# Patient Record
Sex: FEMALE | Race: BLACK OR AFRICAN AMERICAN | NOT HISPANIC OR LATINO | Employment: PART TIME | URBAN - METROPOLITAN AREA
[De-identification: names, ages, dates, MRNs, and addresses within clinical notes are randomized per-mention and may not be internally consistent; named-entity substitution may affect disease eponyms.]

---

## 2017-12-26 ENCOUNTER — HOSPITAL ENCOUNTER (EMERGENCY)
Facility: OTHER | Age: 32
Discharge: HOME | End: 2017-12-26
Attending: EMERGENCY MEDICINE
Payer: MEDICAID

## 2017-12-26 VITALS
SYSTOLIC BLOOD PRESSURE: 186 MMHG | DIASTOLIC BLOOD PRESSURE: 86 MMHG | OXYGEN SATURATION: 100 % | HEART RATE: 86 BPM | RESPIRATION RATE: 19 BRPM | HEIGHT: 66 IN | TEMPERATURE: 99 F | WEIGHT: 210 LBS | BODY MASS INDEX: 33.75 KG/M2

## 2017-12-26 DIAGNOSIS — J11.1 INFLUENZA-LIKE ILLNESS: Primary | ICD-10-CM

## 2017-12-26 LAB
B-HCG UR QL: NEGATIVE
CTP QC/QA: YES
FLUAV AG SPEC QL IA: NEGATIVE
FLUBV AG SPEC QL IA: NEGATIVE
SPECIMEN SOURCE: NORMAL

## 2017-12-26 PROCEDURE — 87400 INFLUENZA A/B EACH AG IA: CPT | Mod: 59

## 2017-12-26 PROCEDURE — 99284 EMERGENCY DEPT VISIT MOD MDM: CPT

## 2017-12-26 PROCEDURE — 81025 URINE PREGNANCY TEST: CPT | Performed by: PHYSICIAN ASSISTANT

## 2017-12-26 PROCEDURE — 25000003 PHARM REV CODE 250: Performed by: PHYSICIAN ASSISTANT

## 2017-12-26 RX ORDER — IBUPROFEN 600 MG/1
600 TABLET ORAL EVERY 6 HOURS PRN
Qty: 20 TABLET | Refills: 0 | Status: SHIPPED | OUTPATIENT
Start: 2017-12-26

## 2017-12-26 RX ORDER — GUAIFENESIN/DEXTROMETHORPHAN 100-10MG/5
5 SYRUP ORAL 4 TIMES DAILY PRN
Qty: 120 ML | Refills: 0 | Status: SHIPPED | OUTPATIENT
Start: 2017-12-26 | End: 2018-01-05

## 2017-12-26 RX ORDER — IBUPROFEN 600 MG/1
600 TABLET ORAL
Status: COMPLETED | OUTPATIENT
Start: 2017-12-26 | End: 2017-12-26

## 2017-12-26 RX ORDER — FLUTICASONE PROPIONATE 50 MCG
1 SPRAY, SUSPENSION (ML) NASAL 2 TIMES DAILY PRN
Qty: 15 G | Refills: 0 | Status: SHIPPED | OUTPATIENT
Start: 2017-12-26

## 2017-12-26 RX ORDER — GUAIFENESIN/DEXTROMETHORPHAN 100-10MG/5
5 SYRUP ORAL 4 TIMES DAILY PRN
Qty: 120 ML | Refills: 0 | COMMUNITY
Start: 2017-12-26 | End: 2018-01-05

## 2017-12-26 RX ORDER — DICLOFENAC SODIUM 25 MG/1
50 TABLET, DELAYED RELEASE ORAL 3 TIMES DAILY
COMMUNITY

## 2017-12-26 RX ORDER — AMLODIPINE BESYLATE 5 MG/1
5 TABLET ORAL DAILY
COMMUNITY

## 2017-12-26 RX ADMIN — IBUPROFEN 600 MG: 600 TABLET, FILM COATED ORAL at 05:12

## 2017-12-26 NOTE — ED PROVIDER NOTES
Encounter Date: 2017       History     Chief Complaint   Patient presents with    Generalized Body Aches     congestion w/ body aches x2 days     Patient is 32 year old female who presents with complaints of bodyaches and nasal congestion that started today. She reports her  was recently diagnosed with the flu and admits that her children have similar symptoms.  She has only taken a single dose of TheraFlu earlier today.  She denies chest pain, shortness of breath, dizziness, altered mental status but does endorse subjective fever.  She has no report of hemoptysis bladder or bowel incontinence or problems urinating.  Currently  accompanied by her 3 children who are also patients in the emergency department.            Review of patient's allergies indicates:  No Known Allergies  Past Medical History:   Diagnosis Date    Hypertension      Past Surgical History:   Procedure Laterality Date     SECTION       History reviewed. No pertinent family history.  Social History   Substance Use Topics    Smoking status: Never Smoker    Smokeless tobacco: Never Used    Alcohol use No     Review of Systems   Constitutional: Negative for fever.        Subjective fever   HENT: Positive for congestion. Negative for sore throat.    Respiratory: Negative for shortness of breath.    Cardiovascular: Negative for chest pain.   Gastrointestinal: Negative for nausea.   Genitourinary: Negative for dysuria.   Musculoskeletal: Positive for myalgias. Negative for back pain.   Skin: Negative for rash.   Neurological: Negative for weakness.   Hematological: Does not bruise/bleed easily.       Physical Exam     Initial Vitals [17 1501]   BP Pulse Resp Temp SpO2   (!) 186/94 100 18 97.8 °F (36.6 °C) 100 %      MAP       124.67         Physical Exam    Nursing note and vitals reviewed.  Constitutional: She appears well-developed and well-nourished. She is not diaphoretic. No distress.   Healthy appearing female in  NAD or apparent pain. She makes good eye contact, speaks in clear full sentences and ambulates with ease.    HENT:   Head: Normocephalic and atraumatic.   Boggy nasal turbinates bilaterally  Clear posterior oropharnyx without edema or exudate.   TM's clear bilaterally.    Eyes: Conjunctivae are normal. Pupils are equal, round, and reactive to light. Right eye exhibits no discharge. Left eye exhibits no discharge. No scleral icterus.   Neck: Normal range of motion.   Cardiovascular: Normal rate, regular rhythm, normal heart sounds and intact distal pulses. Exam reveals no gallop and no friction rub.    No murmur heard.  Pulmonary/Chest: Breath sounds normal. She has no wheezes. She has no rhonchi. She has no rales.   CTA bilaterally    Abdominal: Soft. Bowel sounds are normal. There is no tenderness. There is no rebound and no guarding.   Musculoskeletal: Normal range of motion. She exhibits no edema or tenderness.   Lymphadenopathy:     She has no cervical adenopathy.   Neurological: She is alert and oriented to person, place, and time. She has normal strength. No cranial nerve deficit or sensory deficit.   Skin: Skin is warm. No rash and no abscess noted. No erythema.   Psychiatric: She has a normal mood and affect. Her behavior is normal. Judgment and thought content normal.         ED Course   Procedures  Labs Reviewed   INFLUENZA A AND B ANTIGEN             Medical Decision Making:   ED Management:  Urgent evaluation of 32-year-old female who presents with complaints most consistent with influenza-like illness.  She is afebrile, nontoxic appearing, hemodynamically stable.  Physical exam outlined above and reveals boggy nasal turbinates with no evidence of acute HEENT bacterial infection.  Lungs are clear to auscultation with no hypoxia.  No imaging felt warranted.  Rapid flu pending.  We'll treat with anti-inflammatories and symptom relief.  We'll encourage rest, hydration, follow-up with pediatrician in 1-2  days for symptom recheck. She is amenable to plan.   Other:   I have discussed this case with another health care provider.       <> Summary of the Discussion: [place]                   ED Course      Clinical Impression:   The encounter diagnosis was Influenza-like illness.                           Gabriella Linton PA-C  12/26/17 [id number]

## 2017-12-26 NOTE — ED TRIAGE NOTES
Pt boyfriend has the flu and now pt feeling ill with body aches, sore throat, cough, congestion for the past 2 days

## 2019-11-14 DIAGNOSIS — M25.561 RIGHT KNEE PAIN: Primary | ICD-10-CM

## 2021-04-28 ENCOUNTER — IMMUNIZATION (OUTPATIENT)
Dept: PRIMARY CARE CLINIC | Facility: CLINIC | Age: 36
End: 2021-04-28

## 2021-04-28 DIAGNOSIS — Z23 NEED FOR VACCINATION: Primary | ICD-10-CM

## 2021-04-28 PROCEDURE — 91300 COVID-19, MRNA, LNP-S, PF, 30 MCG/0.3 ML DOSE VACCINE: CPT | Mod: S$GLB,,, | Performed by: INTERNAL MEDICINE

## 2021-04-28 PROCEDURE — 0001A COVID-19, MRNA, LNP-S, PF, 30 MCG/0.3 ML DOSE VACCINE: CPT | Mod: CV19,S$GLB,, | Performed by: INTERNAL MEDICINE

## 2021-04-28 PROCEDURE — 91300 COVID-19, MRNA, LNP-S, PF, 30 MCG/0.3 ML DOSE VACCINE: ICD-10-PCS | Mod: S$GLB,,, | Performed by: INTERNAL MEDICINE

## 2021-04-28 PROCEDURE — 0001A COVID-19, MRNA, LNP-S, PF, 30 MCG/0.3 ML DOSE VACCINE: ICD-10-PCS | Mod: CV19,S$GLB,, | Performed by: INTERNAL MEDICINE

## 2022-07-12 ENCOUNTER — HOSPITAL ENCOUNTER (EMERGENCY)
Facility: HOSPITAL | Age: 37
Discharge: HOME | End: 2022-07-12
Attending: EMERGENCY MEDICINE
Payer: MEDICAID

## 2022-07-12 VITALS
SYSTOLIC BLOOD PRESSURE: 140 MMHG | HEART RATE: 80 BPM | OXYGEN SATURATION: 99 % | TEMPERATURE: 99 F | RESPIRATION RATE: 14 BRPM | BODY MASS INDEX: 41.97 KG/M2 | WEIGHT: 260 LBS | DIASTOLIC BLOOD PRESSURE: 80 MMHG

## 2022-07-12 DIAGNOSIS — B34.9 ACUTE VIRAL SYNDROME: Primary | ICD-10-CM

## 2022-07-12 DIAGNOSIS — Z20.822 CLOSE EXPOSURE TO COVID-19 VIRUS: ICD-10-CM

## 2022-07-12 LAB
CTP QC/QA: YES
SARS-COV-2 RDRP RESP QL NAA+PROBE: NEGATIVE

## 2022-07-12 PROCEDURE — 25000003 PHARM REV CODE 250: Performed by: EMERGENCY MEDICINE

## 2022-07-12 PROCEDURE — U0002 COVID-19 LAB TEST NON-CDC: HCPCS | Performed by: EMERGENCY MEDICINE

## 2022-07-12 PROCEDURE — 99284 EMERGENCY DEPT VISIT MOD MDM: CPT | Mod: CS,,, | Performed by: EMERGENCY MEDICINE

## 2022-07-12 PROCEDURE — 99284 PR EMERGENCY DEPT VISIT,LEVEL IV: ICD-10-PCS | Mod: CS,,, | Performed by: EMERGENCY MEDICINE

## 2022-07-12 PROCEDURE — 99283 EMERGENCY DEPT VISIT LOW MDM: CPT

## 2022-07-12 RX ORDER — IBUPROFEN 600 MG/1
600 TABLET ORAL
Status: COMPLETED | OUTPATIENT
Start: 2022-07-12 | End: 2022-07-12

## 2022-07-12 RX ADMIN — IBUPROFEN 600 MG: 600 TABLET, FILM COATED ORAL at 01:07

## 2022-07-12 NOTE — ED PROVIDER NOTES
Encounter Date: 2022       History     Chief Complaint   Patient presents with    COVID-19 Concerns     Pt to ER via EMS c/o HA, body aches, chills and SOB on exertion starting this am. Last tylenol at 1030.      37-year-old female woke up this morning with sore throat, fatigue, and body aches.  Subjective fever.  Both children with similar symptoms.  She is vaccinated for COVID.  Took Tylenol.  Associated cough.  Patient denies nausea, vomiting, diarrhea, shortness of breath, chest pain, abdominal pain, or dysuria.  A ten point review of systems was completed and is negative except as documented above.  Patient denies any other acute medical complaint.  The patients available PMH, PSH, Social History, medications, allergies, and triage vital signs were reviewed just prior to their medical evaluation.        Review of patient's allergies indicates:  No Known Allergies  Past Medical History:   Diagnosis Date    Hypertension      Past Surgical History:   Procedure Laterality Date     SECTION       No family history on file.  Social History     Tobacco Use    Smoking status: Never Smoker    Smokeless tobacco: Never Used   Substance Use Topics    Alcohol use: No    Drug use: No     Review of Systems   Constitutional: Positive for fatigue and fever.   HENT: Negative for sore throat.    Eyes: Negative for visual disturbance.   Respiratory: Positive for cough. Negative for shortness of breath.    Cardiovascular: Negative for chest pain.   Gastrointestinal: Negative for abdominal pain, diarrhea, nausea and vomiting.   Genitourinary: Negative for dysuria.   Musculoskeletal: Positive for myalgias. Negative for neck pain.   Skin: Negative for rash and wound.   Allergic/Immunologic: Negative for immunocompromised state.   Neurological: Negative for syncope.   Psychiatric/Behavioral: Negative for confusion.       Physical Exam     Initial Vitals [22 1230]   BP Pulse Resp Temp SpO2   (!) 140/80 80 14 99  °F (37.2 °C) 99 %      MAP       --         Physical Exam    Nursing note and vitals reviewed.  Constitutional: She appears well-developed and well-nourished. She is not diaphoretic. No distress.   HENT:   Head: Normocephalic and atraumatic.   Nose: Nose normal.   Eyes: Conjunctivae are normal. Right eye exhibits no discharge. Left eye exhibits no discharge.   Neck: Neck supple.   Normal range of motion.  Cardiovascular: Normal rate, regular rhythm and normal heart sounds. Exam reveals no gallop and no friction rub.    No murmur heard.  Pulmonary/Chest: Breath sounds normal. No respiratory distress. She has no wheezes. She has no rhonchi. She has no rales.   Abdominal: She exhibits no distension.   Musculoskeletal:         General: No tenderness or edema. Normal range of motion.      Cervical back: Normal range of motion and neck supple.     Neurological: She is alert and oriented to person, place, and time. GCS score is 15. GCS eye subscore is 4. GCS verbal subscore is 5. GCS motor subscore is 6.   Skin: Skin is warm and dry. No rash noted. No erythema.   Psychiatric: She has a normal mood and affect. Her behavior is normal. Judgment and thought content normal.         ED Course   Procedures  Labs Reviewed   SARS-COV-2 RDRP GENE          Imaging Results    None          Medications   ibuprofen tablet 600 mg (600 mg Oral Given 7/12/22 1315)     Medical Decision Making:   History:   Old Medical Records: I decided to obtain old medical records.  Old Records Summarized: other records.       <> Summary of Records: child's covid test  Clinical Tests:   Lab Tests: Ordered and Reviewed  ED Management:  37-year-old female presents with acute viral syndrome.  Vitals unremarkable.  Physical exam benign.  COVID negative.  However, child COVID test is positive.  She likely has COVID.  No hypoxia.  Will discharge home to quarantine.  Patient will return to ED for worsening symptoms, inability to eat/drink, fever greater than  100.4, or any other concerns.  Did bedside teaching with return precautions.  All questions answered.  The patient acknowledges understanding.  Gave verbal discharge instructions.                      Clinical Impression:   Final diagnoses:  [B34.9] Acute viral syndrome (Primary)  [Z20.822] Close exposure to COVID-19 virus          ED Disposition Condition    Discharge Stable        ED Prescriptions     None        Follow-up Information     Follow up With Specialties Details Why Contact Info    Follow up with primary physician as soon as possible.  Call tomorrow for an appointment.        [facility] - Emergency Dept Emergency Medicine  Return to ED for worsening symptoms, inability to eat/drink, fever greater than 100.4, or any other concerns. [address]  [facility] [id number]  [phone]           Deonte Stephens MD  07/12/22 [id number]

## 2022-07-12 NOTE — Clinical Note
"Shawanda Oakleyjaxson Pascual was seen and treated in our emergency department on 7/12/2022.  She may return to work on 07/14/2022.       If you have any questions or concerns, please don't hesitate to call.      Deonte Stephens MD"

## 2022-07-12 NOTE — Clinical Note
"Shawanda "Darian Pascual was seen and treated in our emergency department on 7/12/2022.     COVID-19 is present in our communities across the state. There is limited testing for COVID at this time, so not all patients can be tested. In this situation, your employee meets the following criteria:    Shawanda Pascual has met the criteria for COVID-19 testing based upon symptoms, travel, and/or potential exposure. The test has been completed and is pending results at this time. During this time the employee is not able to work and should be quarantined per the Centers for Disease Control timelines.     If you have any questions or concerns, or if I can be of further assistance, please do not hesitate to contact me.    Sincerely,             Deonte Stephens MD"

## 2022-07-12 NOTE — DISCHARGE INSTRUCTIONS
Take Tylenol over the counter as directed on packaging as needed for pain or fever.     Home quarantine for 7 days.    Our goal in the emergency department is to always give you outstanding care and exceptional service. You may receive a survey by mail or e-mail in the next week regarding your experience in our ED. We would greatly appreciate your completing and returning the survey. Your feedback provides us with a way to recognize our staff who give very good care and it helps us learn how to improve when your experience was below our aspiration of excellence.

## 2023-07-05 ENCOUNTER — HOSPITAL ENCOUNTER (OUTPATIENT)
Dept: RADIOLOGY | Facility: OTHER | Age: 38
Discharge: HOME | End: 2023-07-05
Attending: NURSE PRACTITIONER
Payer: MEDICAID

## 2023-07-05 DIAGNOSIS — Z12.31 VISIT FOR SCREENING MAMMOGRAM: ICD-10-CM

## 2023-07-05 PROCEDURE — 77067 SCR MAMMO BI INCL CAD: CPT | Mod: TC

## 2023-07-05 PROCEDURE — 77063 BREAST TOMOSYNTHESIS BI: CPT | Mod: 26,,, | Performed by: RADIOLOGY

## 2023-07-05 PROCEDURE — 77067 SCR MAMMO BI INCL CAD: CPT | Mod: 26,,, | Performed by: RADIOLOGY

## 2023-07-05 PROCEDURE — 77063 MAMMO DIGITAL SCREENING BILAT WITH TOMO: ICD-10-PCS | Mod: 26,,, | Performed by: RADIOLOGY

## 2023-07-05 PROCEDURE — 77067 MAMMO DIGITAL SCREENING BILAT WITH TOMO: ICD-10-PCS | Mod: 26,,, | Performed by: RADIOLOGY

## 2024-08-10 ENCOUNTER — HOSPITAL ENCOUNTER (OUTPATIENT)
Dept: RADIOLOGY | Facility: OTHER | Age: 39
Discharge: HOME | End: 2024-08-10
Attending: PHYSICIAN ASSISTANT
Payer: MEDICAID

## 2024-08-10 DIAGNOSIS — Z12.31 ENCOUNTER FOR SCREENING MAMMOGRAM FOR MALIGNANT NEOPLASM OF BREAST: ICD-10-CM

## 2024-08-10 PROCEDURE — 77067 SCR MAMMO BI INCL CAD: CPT | Mod: 26,,, | Performed by: RADIOLOGY

## 2024-08-10 PROCEDURE — 77067 SCR MAMMO BI INCL CAD: CPT | Mod: TC

## 2024-08-10 PROCEDURE — 77063 BREAST TOMOSYNTHESIS BI: CPT | Mod: 26,,, | Performed by: RADIOLOGY

## 2025-07-26 ENCOUNTER — HOSPITAL ENCOUNTER (EMERGENCY)
Facility: OTHER | Age: 40
Discharge: HOME | End: 2025-07-26
Payer: MEDICAID

## 2025-07-26 VITALS
OXYGEN SATURATION: 100 % | WEIGHT: 236 LBS | SYSTOLIC BLOOD PRESSURE: 147 MMHG | HEART RATE: 70 BPM | TEMPERATURE: 98 F | HEIGHT: 66 IN | BODY MASS INDEX: 37.93 KG/M2 | RESPIRATION RATE: 20 BRPM | DIASTOLIC BLOOD PRESSURE: 85 MMHG

## 2025-07-26 DIAGNOSIS — R20.0 LEFT ARM NUMBNESS: Primary | ICD-10-CM

## 2025-07-26 DIAGNOSIS — R29.818 ACUTE FOCAL NEUROLOGICAL DEFICIT: ICD-10-CM

## 2025-07-26 LAB
ABSOLUTE EOSINOPHIL (OHS): 0.15 K/UL
ABSOLUTE MONOCYTE (OHS): 1.16 K/UL (ref 0.3–1)
ABSOLUTE NEUTROPHIL COUNT (OHS): 10.04 K/UL (ref 1.8–7.7)
ALBUMIN SERPL BCP-MCNC: 3.8 G/DL (ref 3.5–5.2)
ALP SERPL-CCNC: 57 UNIT/L (ref 40–150)
ALT SERPL W/O P-5'-P-CCNC: <8 UNIT/L (ref 10–44)
ANION GAP (OHS): 8 MMOL/L (ref 8–16)
AST SERPL-CCNC: 22 UNIT/L (ref 11–45)
B-HCG UR QL: NEGATIVE
BASOPHILS # BLD AUTO: 0.05 K/UL
BASOPHILS NFR BLD AUTO: 0.3 %
BILIRUB SERPL-MCNC: 0.5 MG/DL (ref 0.1–1)
BUN SERPL-MCNC: 10 MG/DL (ref 6–20)
CALCIUM SERPL-MCNC: 9.5 MG/DL (ref 8.7–10.5)
CHLORIDE SERPL-SCNC: 103 MMOL/L (ref 95–110)
CO2 SERPL-SCNC: 27 MMOL/L (ref 23–29)
CREAT SERPL-MCNC: 1.1 MG/DL (ref 0.5–1.4)
CTP QC/QA: YES
ERYTHROCYTE [DISTWIDTH] IN BLOOD BY AUTOMATED COUNT: 14.2 % (ref 11.5–14.5)
GFR SERPLBLD CREATININE-BSD FMLA CKD-EPI: >60 ML/MIN/1.73/M2
GLUCOSE SERPL-MCNC: 104 MG/DL (ref 70–110)
HCT VFR BLD AUTO: 36.3 % (ref 37–48.5)
HGB BLD-MCNC: 11.8 GM/DL (ref 12–16)
HOLD SPECIMEN: NORMAL
HOLD SPECIMEN: NORMAL
IMM GRANULOCYTES # BLD AUTO: 0.05 K/UL (ref 0–0.04)
IMM GRANULOCYTES NFR BLD AUTO: 0.3 % (ref 0–0.5)
INR PPP: 1 (ref 0.8–1.2)
LYMPHOCYTES # BLD AUTO: 2.93 K/UL (ref 1–4.8)
MAGNESIUM SERPL-MCNC: 2.2 MG/DL (ref 1.6–2.6)
MCH RBC QN AUTO: 31 PG (ref 27–31)
MCHC RBC AUTO-ENTMCNC: 32.5 G/DL (ref 32–36)
MCV RBC AUTO: 95 FL (ref 82–98)
NUCLEATED RBC (/100WBC) (OHS): 0 /100 WBC
PLATELET # BLD AUTO: 379 K/UL (ref 150–450)
PMV BLD AUTO: 10.1 FL (ref 9.2–12.9)
POTASSIUM SERPL-SCNC: 3.3 MMOL/L (ref 3.5–5.1)
PROT SERPL-MCNC: 8.1 GM/DL (ref 6–8.4)
PROTHROMBIN TIME: 10.9 SECONDS (ref 9–12.5)
RBC # BLD AUTO: 3.81 M/UL (ref 4–5.4)
RELATIVE EOSINOPHIL (OHS): 1 %
RELATIVE LYMPHOCYTE (OHS): 20.4 % (ref 18–48)
RELATIVE MONOCYTE (OHS): 8.1 % (ref 4–15)
RELATIVE NEUTROPHIL (OHS): 69.9 % (ref 38–73)
SODIUM SERPL-SCNC: 138 MMOL/L (ref 136–145)
TSH SERPL-ACNC: 2.11 UIU/ML (ref 0.4–4)
WBC # BLD AUTO: 14.38 K/UL (ref 3.9–12.7)

## 2025-07-26 PROCEDURE — 99285 EMERGENCY DEPT VISIT HI MDM: CPT | Mod: 25

## 2025-07-26 PROCEDURE — 85610 PROTHROMBIN TIME: CPT

## 2025-07-26 PROCEDURE — 80061 LIPID PANEL: CPT

## 2025-07-26 PROCEDURE — 80053 COMPREHEN METABOLIC PANEL: CPT

## 2025-07-26 PROCEDURE — 85025 COMPLETE CBC W/AUTO DIFF WBC: CPT

## 2025-07-26 PROCEDURE — 84443 ASSAY THYROID STIM HORMONE: CPT

## 2025-07-26 PROCEDURE — 93005 ELECTROCARDIOGRAM TRACING: CPT

## 2025-07-26 PROCEDURE — 93010 ELECTROCARDIOGRAM REPORT: CPT | Mod: ,,, | Performed by: INTERNAL MEDICINE

## 2025-07-26 PROCEDURE — 81025 URINE PREGNANCY TEST: CPT

## 2025-07-26 PROCEDURE — 83735 ASSAY OF MAGNESIUM: CPT

## 2025-07-26 NOTE — Clinical Note
"Shawanda Oakleyjaxson Pascual was seen and treated in our emergency department on 7/26/2025.  She may return to work on 07/30/2025.       If you have any questions or concerns, please don't hesitate to call.      Breanna Hull MD"

## 2025-07-27 LAB
CHOLEST SERPL-MCNC: 175 MG/DL (ref 120–199)
CHOLEST/HDLC SERPL: 4.2 {RATIO} (ref 2–5)
HDLC SERPL-MCNC: 42 MG/DL (ref 40–75)
HDLC SERPL: 24 % (ref 20–50)
LDLC SERPL CALC-MCNC: 116.4 MG/DL (ref 63–159)
NONHDLC SERPL-MCNC: 133 MG/DL
OHS QRS DURATION: 88 MS
OHS QTC CALCULATION: 447 MS
TRIGL SERPL-MCNC: 83 MG/DL (ref 30–150)

## 2025-07-27 NOTE — DISCHARGE INSTRUCTIONS
Diagnosis: arm numbness     Tests today showed:   Labs Reviewed   COMPREHENSIVE METABOLIC PANEL - Abnormal       Result Value    Sodium 138      Potassium 3.3 (*)     Chloride 103      CO2 27      Glucose 104      BUN 10      Creatinine 1.1      Calcium 9.5      Protein Total 8.1      Albumin 3.8      Bilirubin Total 0.5      ALP 57      AST 22      ALT <8 (*)     Anion Gap 8      eGFR >60     CBC WITH DIFFERENTIAL - Abnormal    WBC 14.38 (*)     RBC 3.81 (*)     HGB 11.8 (*)     HCT 36.3 (*)     MCV 95      MCH 31.0      MCHC 32.5      RDW 14.2      Platelet Count 379      MPV 10.1      Nucleated RBC 0      Neut % 69.9      Lymph % 20.4      Mono % 8.1      Eos % 1.0      Basophil % 0.3      Imm Grans % 0.3      Neut # 10.04 (*)     Lymph # 2.93      Mono # 1.16 (*)     Eos # 0.15      Baso # 0.05      Imm Grans # 0.05 (*)    PROTIME-INR - Normal    PT 10.9      INR 1.0     TSH - Normal    TSH 2.106     MAGNESIUM - Normal    Magnesium  2.2     CBC W/ AUTO DIFFERENTIAL    Narrative:     The following orders were created for panel order CBC W/ AUTO DIFFERENTIAL.  Procedure                               Abnormality         Status                     ---------                               -----------         ------                     CBC with Differential[[id number]]       Abnormal            Final result                 Please view results for these tests on the individual orders.   EXTRA TUBES    Narrative:     The following orders were created for panel order EXTRA TUBES.  Procedure                               Abnormality         Status                     ---------                               -----------         ------                     Gold Top Hold[[id number]]                                   Final result               Gold Top Hold[[id number]]                                   Final result                 Please view results for these tests on the individual orders.   GOLD TOP HOLD    Extra Tube x      GOLD TOP HOLD    Extra Tube x     LIPID PANEL   POCT URINE PREGNANCY    POC Preg Test, Ur Negative       Acceptable Yes       CT Head Without Contrast   Final Result   IMPRESSION:    Unremarkable CT of the head.      -Electronically Signed By: Finesse Juan MD    -Electronically Signed On:  7/26/2025 9:13 PM         Report Ends          Treatments you had today:   Medications - No data to display    Follow-Up Plan:  - Follow-up with primary care doctor within 3 - 5 days  - Additional testing and/or evaluation as directed by your primary doctor    Return to the Emergency Department for symptoms including but not limited to: worsening symptoms, shortness of breath or chest pain, vomiting with inability to hold down fluids, fevers greater than 100.4°F, passing out/fainting/unconsciousness, or other concerning symptoms.

## 2025-07-27 NOTE — ED PROVIDER NOTES
"Encounter Date: 2025       History     Chief Complaint   Patient presents with    Numbness     Pt presents to the ER with complaints of heaviness and numbness to the left upper arm since waking this morning around 9am. No symptoms upon going to bed last night around 11pm. Pt denies additional neuro complaints.       The patient is a very pleasant 40-year-old female with a history of hypertension is presenting to the emergency department for evaluation of the left upper extremity numbness and" heaviness".  Went to bed at 11:00 p.m. last night.  Woke up around 9:00 a.m. and 1st noted symptoms.  Thought that she had slept on her arm and her arm had fallen asleep.  However symptoms persisted throughout the day.  She denies any neck pain.  Has a any recent fall or injury.  Not on any blood thinners.  No symptoms in her lower extremities.  She has been able to ambulate without difficulty.  No changes to speech or vision.    The history is provided by the patient. No  was used.     Review of patient's allergies indicates:  No Known Allergies  Past Medical History:   Diagnosis Date    Hypertension      Past Surgical History:   Procedure Laterality Date     SECTION       No family history on file.  Social History[1]  Review of Systems    Physical Exam     Initial Vitals [25]   BP Pulse Resp Temp SpO2   (!) 152/92 91 16 98 °F (36.7 °C) 99 %      MAP       --         Physical Exam    Nursing note and vitals reviewed.  Constitutional: She is not diaphoretic. No distress.   HENT:   Head: Normocephalic and atraumatic.   Eyes: Conjunctivae are normal.   Cardiovascular:  Normal rate and regular rhythm.           Pulmonary/Chest: No respiratory distress. She has no wheezes. She has no rhonchi. She has no rales.   Abdominal: Abdomen is soft. She exhibits no distension. There is no abdominal tenderness. There is no rebound and no guarding.   Musculoskeletal:         General: No tenderness " (No midline cervical spine tenderness) or edema. Normal range of motion.     Neurological: She is alert and oriented to person, place, and time. A sensory deficit (L bicep decreased sensation to light touch, weakness to left upper extremity with elevation of the arms, no pronator drift or dysmetria with finger-to-nose) is present. No cranial nerve deficit.   Skin: Skin is warm and dry.   Psychiatric: She has a normal mood and affect. Thought content normal.         ED Course   Procedures  Labs Reviewed   COMPREHENSIVE METABOLIC PANEL - Abnormal       Result Value    Sodium 138      Potassium 3.3 (*)     Chloride 103      CO2 27      Glucose 104      BUN 10      Creatinine 1.1      Calcium 9.5      Protein Total 8.1      Albumin 3.8      Bilirubin Total 0.5      ALP 57      AST 22      ALT <8 (*)     Anion Gap 8      eGFR >60     CBC WITH DIFFERENTIAL - Abnormal    WBC 14.38 (*)     RBC 3.81 (*)     HGB 11.8 (*)     HCT 36.3 (*)     MCV 95      MCH 31.0      MCHC 32.5      RDW 14.2      Platelet Count 379      MPV 10.1      Nucleated RBC 0      Neut % 69.9      Lymph % 20.4      Mono % 8.1      Eos % 1.0      Basophil % 0.3      Imm Grans % 0.3      Neut # 10.04 (*)     Lymph # 2.93      Mono # 1.16 (*)     Eos # 0.15      Baso # 0.05      Imm Grans # 0.05 (*)    PROTIME-INR - Normal    PT 10.9      INR 1.0     TSH - Normal    TSH 2.106     MAGNESIUM - Normal    Magnesium  2.2     CBC W/ AUTO DIFFERENTIAL    Narrative:     The following orders were created for panel order CBC W/ AUTO DIFFERENTIAL.  Procedure                               Abnormality         Status                     ---------                               -----------         ------                     CBC with Differential[[id number]]       Abnormal            Final result                 Please view results for these tests on the individual orders.   LIPID PANEL    Cholesterol Total 175      Triglyceride 83      HDL Cholesterol 42      LDL  Cholesterol 116.4      HDL/Cholesterol Ratio 24.0      Cholesterol/HDL Ratio 4.2      Non HDL Cholesterol 133     EXTRA TUBES    Narrative:     The following orders were created for panel order EXTRA TUBES.  Procedure                               Abnormality         Status                     ---------                               -----------         ------                     Gold Top Hold[[id number]]                                   Final result               Gold Top Hold[[id number]]                                   Final result                 Please view results for these tests on the individual orders.   GOLD TOP HOLD    Extra Tube x     GOLD TOP HOLD    Extra Tube x     POCT URINE PREGNANCY    POC Preg Test, Ur Negative       Acceptable Yes       EKG Readings: (Independently Interpreted)   Initial Reading: No STEMI. Previous EKG: Compared with most recent EKG Rhythm: Normal Sinus Rhythm. Heart Rate: 73. ST Segments: Normal ST Segments. T Waves: Normal.     ECG Results              ECG 12 lead (Final result)        Collection Time Result Time QRS Duration OHS QTC Calculation    07/26/25 20:47:19 07/27/25 11:45:08 88 447                     Final result by Interface, Lab In [facility] (07/27/25 11:45:18)                   Narrative:    Test Reason : R29.818,    Vent. Rate :  73 BPM     Atrial Rate :  73 BPM     P-R Int : 150 ms          QRS Dur :  88 ms      QT Int : 406 ms       P-R-T Axes :  53  64  41 degrees    QTcB Int : 447 ms    Normal sinus rhythm  Normal ECG    Confirmed by KYREE Villanueva ([id number]) on 7/27/2025 11:45:05 AM    Referred By: AAAREFERRAL SELF           Confirmed By: KYREE Villanueva                                  Imaging Results              CT Head Without Contrast (Final result)  Result time 07/26/25 21:13:57      Final result by Finesse Juan MD (07/26/25 21:13:57)                   Impression:    IMPRESSION:   Unremarkable CT of the head.    -Electronically Signed By:  "Finesse Juan MD   -Electronically Signed On:  7/26/2025 9:13 PM      Report Ends               Narrative:    EXAM: CT HEAD WITHOUT CONTRAST    HISTORY: Neurologic deficit.    TECHNIQUE: Multiplanar CT images of the head without intravenous contrast. CT scan performed using appropriate/available dose optimization/reduction/ALARA techniques.    COMPARISON: None are available.    FINDINGS: The brain parenchyma and CSF-containing spaces are unremarkable. Gray-white differentiation is grossly preserved without definite evidence of an acute territorial infarct. An acute infarct cannot be excluded with this study. No acute intracranial hemorrhage, mass or ventricular obstruction. The calvarium is intact. The visualized paranasal sinuses and mastoid air cells are well aerated.                                          Medications - No data to display  Medical Decision Making  40-year-old female with a history of hypertension is presenting to the emergency department for evaluation of the left upper extremity numbness and" heaviness".  Went to bed at 11:00 p.m. last night.  Woke up around 9:00 a.m. and 1st noted symptoms.  Thought that she had slept on her arm and her arm had fallen asleep.  However symptoms persisted throughout the day.  She denies any neck pain.  Has a any recent fall or injury.  Not on any blood thinners.  No symptoms in her lower extremities.  She has been able to ambulate without difficulty.  No changes to speech or vision.  Denies any headache.    Subacute stroke order set placed.  Patient does have numbness to the left biceps area, does not extend distally however.  No midline cervical spine tenderness.  Has some difficulty with elevation of the left upper extremity, not because of pain.  No pain in the shoulder.  However has symmetric strength with biceps/try slipping handgrip.    Amount and/or Complexity of Data Reviewed  Labs: ordered. Decision-making details documented in ED Course.  Radiology: " ordered. Decision-making details documented in ED Course.               ED Course as of 07/27/25 1414   Sat Jul 26, 2025 2033 Comprehensive metabolic panel(!)  CMP grossly unremarkable.  No significant acidosis.  Electrolytes within normal limits.  Normal kidney function. Normal LFTs. [KL]   2033 CBC W/ AUTO DIFFERENTIAL(!)  Nonspecific leukocytosis, patient has no infectious symptoms.  Exam is not consistent with a septic arthritis.  She is afebrile, nontoxic appearing. [KL]   2141 CT Head Without Contrast  Discussed results of CT head and need for MRI to evaluate for stroke with the patient.  She is concerned about claustrophobia and also has piercings in her face which she does not want to removed.  Counseled regarding recommendation for MRI to fully rule out stroke.  Will attempt to get out piercings. [KL]   2241 Counseled again regarding risks of not obtaining MRI.  Will provide referral to Neurology.  Patient understands these risks.  This less strict return precautions including any worsening of neurologic symptoms.  She expressed understanding.  Will follow up with the primary care physician Monday. []   Sun Jul 27, 2025   1413 hCG Qualitative, Urine: Negative [KL]   1413 Critical care time spent on the evaluation and treatment of severe organ dysfunction, review of pertinent labs and imaging studies, discussions with consulting providers and discussions with patient/family: 30 minutes.  CVA evaluation   [KL]      ED Course User Index  [KL] Breanna Hull MD                               Clinical Impression:  Final diagnoses:  [R29.818] Acute focal neurological deficit  [R20.0] Left arm numbness (Primary)          ED Disposition Condition    Discharge Stable          ED Prescriptions    None       Follow-up Information       Follow up With Specialties Details Why Contact Info    [facility] - Emergency Dept Emergency Medicine Go to  As needed, If symptoms worsen [address]  [facility]  [address]  [phone]    [facility]  Schedule an appointment as soon as possible for a visit in 2 days  [address] [facility] [address]  [phone]            Launch MDCalc MDM  MDCalc MDM Module  Jul 27 2025 2:13 PM [Breanna Hull]  Data:  - Independent interpretation: I independently reviewed the ECG 12 lead. It showed no acute abnormality. See MDM section and/or ED Course for my interpretation. [Breanna Hull]  - Test/documents/historian: 3+ tests ordered  3 tests reviewed  3 external notes reviewed  Problems: Acute focal neurological deficit, Concern for Potentially Disabling Neurologic Condition  Additional encounter diagnoses: Left arm numbness  Risk: CT Head WO contr (CT w/o IV contrast)             [1]   Social History  Tobacco Use    Smoking status: Some Days     Types: Cigarettes    Smokeless tobacco: Never   Substance Use Topics    Alcohol use: No    Drug use: No        Breanna Hull MD  07/27/25 0337

## 2025-07-27 NOTE — ED TRIAGE NOTES
Shawanda Pascual, a 40 y.o. female presents to the ED complaining of left upper extremity numbness that began today at 0900. Patient denies any weakness. Patient is awake and alert.    Patient is A&Ox4. Denies any other complaints. ED workup in progress. Safety measures in place; side rails up x2. Call light within pt reach. Plan of care ongoing.

## 2025-08-05 DIAGNOSIS — M79.602 BILATERAL ARM PAIN: Primary | ICD-10-CM

## 2025-08-05 DIAGNOSIS — M79.601 BILATERAL ARM PAIN: Primary | ICD-10-CM

## 2025-08-19 ENCOUNTER — DOCUMENTATION ONLY (OUTPATIENT)
Dept: REHABILITATION | Facility: OTHER | Age: 40
End: 2025-08-19